# Patient Record
Sex: FEMALE | Race: WHITE | NOT HISPANIC OR LATINO | URBAN - METROPOLITAN AREA
[De-identification: names, ages, dates, MRNs, and addresses within clinical notes are randomized per-mention and may not be internally consistent; named-entity substitution may affect disease eponyms.]

---

## 2017-01-01 ENCOUNTER — HOME CARE/HOSPICE - HEALTHEAST (OUTPATIENT)
Dept: HOSPICE | Facility: HOSPICE | Age: 82
End: 2017-01-01

## 2017-01-01 ENCOUNTER — OFFICE VISIT - HEALTHEAST (OUTPATIENT)
Dept: GERIATRICS | Facility: CLINIC | Age: 82
End: 2017-01-01

## 2017-01-01 ENCOUNTER — COMMUNICATION - HEALTHEAST (OUTPATIENT)
Dept: NURSING | Facility: CLINIC | Age: 82
End: 2017-01-01

## 2017-01-01 ENCOUNTER — AMBULATORY - HEALTHEAST (OUTPATIENT)
Dept: NURSING | Facility: CLINIC | Age: 82
End: 2017-01-01

## 2017-01-01 ENCOUNTER — AMBULATORY - HEALTHEAST (OUTPATIENT)
Dept: HOSPICE | Facility: HOSPICE | Age: 82
End: 2017-01-01

## 2017-01-01 DIAGNOSIS — R19.7 DIARRHEA: ICD-10-CM

## 2017-01-01 DIAGNOSIS — C92.00 ACUTE MYELOID LEUKEMIA (H): ICD-10-CM

## 2017-01-01 DIAGNOSIS — D70.9 NEUTROPENIC FEVER (H): ICD-10-CM

## 2017-01-01 DIAGNOSIS — Z51.5 END OF LIFE CARE: ICD-10-CM

## 2017-01-01 DIAGNOSIS — R63.0 LOSS OF APPETITE: ICD-10-CM

## 2017-01-01 DIAGNOSIS — R53.1 WEAKNESS: ICD-10-CM

## 2017-01-01 DIAGNOSIS — R50.81 NEUTROPENIC FEVER (H): ICD-10-CM

## 2017-01-01 DIAGNOSIS — R11.2 NAUSEA AND VOMITING: ICD-10-CM

## 2017-01-01 RX ORDER — ATROPINE SULFATE 10 MG/G
2 OINTMENT OPHTHALMIC EVERY 4 HOURS PRN
Status: SHIPPED | COMMUNITY
Start: 2017-01-01

## 2017-01-01 RX ORDER — LORAZEPAM 0.5 MG/1
1 TABLET ORAL EVERY 6 HOURS
Status: SHIPPED | COMMUNITY
Start: 2017-01-01

## 2017-01-01 RX ORDER — MORPHINE SULFATE 100 MG/5ML
3 SOLUTION ORAL EVERY 4 HOURS
Status: SHIPPED | COMMUNITY
Start: 2017-01-01

## 2017-01-01 RX ORDER — HALOPERIDOL 2 MG/ML
0.5 SOLUTION ORAL EVERY 4 HOURS PRN
Status: SHIPPED | COMMUNITY
Start: 2017-01-01

## 2017-01-16 ENCOUNTER — HOME CARE/HOSPICE - HEALTHEAST (OUTPATIENT)
Dept: HOSPICE | Facility: HOSPICE | Age: 82
End: 2017-01-16

## 2017-01-16 ENCOUNTER — AMBULATORY - HEALTHEAST (OUTPATIENT)
Dept: GERIATRICS | Facility: CLINIC | Age: 82
End: 2017-01-16

## 2021-05-30 VITALS — WEIGHT: 140 LBS | BODY MASS INDEX: 24.8 KG/M2

## 2021-05-30 VITALS — WEIGHT: 141 LBS | BODY MASS INDEX: 24.98 KG/M2

## 2021-05-30 VITALS — BODY MASS INDEX: 24.99 KG/M2 | WEIGHT: 141.1 LBS

## 2021-05-31 ENCOUNTER — RECORDS - HEALTHEAST (OUTPATIENT)
Dept: ADMINISTRATIVE | Facility: CLINIC | Age: 86
End: 2021-05-31

## 2021-06-08 NOTE — PROGRESS NOTES
[facility]    Facility:   [facility] [[id number]]   Code Status: DNR/DNI      CHIEF COMPLAINT/REASON FOR VISIT:  Chief Complaint   Patient presents with     Problem Visit     N/V, diarrhea, and fever       HISTORY:      HPI: Elin is a 88 y.o. female who was seen today per staff's request. Staff reported that patient has not been feeling well and not eating for the last couple days. She also complained of feeling dizzy and feeling very weak. She is also running temperature of 100.7 and HR in 90's. Patient was found in her room lying in her bed appearing very miserable and ill. Patient's  and one of the son were at the bed side during the visit. Son reported that patient has not been feeling well for the last 3 days. She has not been eating due to loss of appetite and feeling nausea. She also c/o feeling very weak and dizzy when up. Discussed with the patient and family that we will treat her nausea and diarrhea and also keep her comfortable. Patient is on hospice care and per her POSLT, no hospitalization, no antibiotic and do not call 911, no transfusion and no blood work. Son and  were explained about the patient's condition and they agreed with the comfort care over aggressive care as per the POLST. [facility] hospice team was notified about the change in patient's condition. Staff was requested to notify if patient continues to have diarrhea. Patient and family agreed with the treatment plan.    Past Medical History   Diagnosis Date     Anemia      Anxiety      Back pain      CHF (congestive heart failure)      Chronic Gout      Diverticulitis      Fainting (Syncope)      Hemorrhoid      Hypertension      MDS (myelodysplastic syndrome) 10/2/2015     Neural foraminal stenosis of lumbar spine 8/22/2015     Orthostatic hypotension      Paroxysmal a-fib      Thyroid Diffuse Enlargement      Warfarin-induced coagulopathy              Family History   Problem Relation  Age of Onset     Leukemia Mother      Heart attack Father      Lung cancer Sister      Lung cancer Brother      Heart attack Brother      Social History     Social History     Marital status:      Spouse name: N/A     Number of children: N/A     Years of education: N/A     Social History Main Topics     Smoking status: Never Smoker     Smokeless tobacco: Never Used     Alcohol use No     Drug use: No     Sexual activity: Not on file     Other Topics Concern     Not on file     Social History Narrative         Review of Systems   Negative unless noted in HPI    .  Vitals:    01/10/17 1557   BP: 109/55   Pulse: 95   Resp: 20   Temp: (!) 100.7  F (38.2  C)   SpO2: 97%   Weight: 141 lb 1.6 oz (64 kg)       Physical Exam   Constitutional:   Appear ill and miserable from feeling nausea and weak   Cardiovascular:   Murmur heard.  Tachycardic   Pulmonary/Chest:   Lung sounds diminished   Abdominal: Soft. She exhibits no distension.   Hypoactive. Mild tenderness to touch on her abdomen.   Genitourinary:   Genitourinary Comments: Denies dysuria or CVA tenderness   Musculoskeletal:   Up with assist of 1-2 to toilet   Neurological: She is alert.   Skin: Skin is warm and dry.   Psychiatric: She has a normal mood and affect. Her behavior is normal. Thought content normal.         LABS:     ASSESSMENT:      ICD-10-CM    1. Nausea and vomiting R11.2    2. Diarrhea R19.7    3. Weakness R53.1    4. Loss of appetite R63.0        PLAN:    1. Lomotil 2.5/0.025 1 tab QID PRN for Diarrhea. Update if diarrhea continues.  2. Ativan 2 mg/ml PO 1 mg Q6H scheduled and Q3H PRN  3. Update patient status tomorrow.    Total 25 minutes of which 65% was spent counseling and coordination of care of the above plan.       IAddison CNP, am scribing for and in the presence of,  Dr. Gonzalez.    I, Dr. Gonzalez,  personally performed the services described in this documentation, as scribed by Addison Valero CNP in my presence, and it is both  accurate and complete.      Electronically signed  by Santiago Gonzalez MD

## 2021-06-08 NOTE — PROGRESS NOTES
Code Status:  DNR/DNI  Visit Type: Problem Visit (end of life)     Facility:  [facility] [[id number]]        Facility Type:  (Long Term Care, LTC)    History of Present Illness: Elin Hernandez is a 88 y.o. female Who has chronic myelogenous leukemia and has been severely neutropenic and pancytopenia. She's drifted into a coma now and is feverish and appears to be working hard to breathe as well as with the rapid heart rate. Family had asked me to come in and see her this time. She had been running a fever as well.    Review of Systems     Physical Exam   Constitutional:   Patient is nonresponsive reading rapidly in the 30s beginning to be slightly raspy. No focal infiltrate. Does not really respond to stimuli. Has a rapid heart rate is indicated above. Appears slightly anxious. His hot to the touch   Vitals reviewed.      Labs:  All labs reviewed in the nursing home record.    Assessment:  1. Acute myeloid leukemia     2. Neutropenic fever     3. End of life care         Plan: Discuss with the family that we are nearing the end time that her urine output has shut down in that she has no ability to fight infection with the pancytopenia. Further discussed that for relief we will increase the Ativan dosing. She will get 1 mg more frequently scheduled and PRN will be available. In addition to this ibuprofen liquid 40 mg/mL we will give 1 tablespoon Q4 hours PRN.      25 minutes spent of which greater than 65% was face to face communication with the patient about above plan of care    Electronically signed by: Santiago Gonzalez MD